# Patient Record
Sex: MALE | Race: WHITE | NOT HISPANIC OR LATINO | URBAN - METROPOLITAN AREA
[De-identification: names, ages, dates, MRNs, and addresses within clinical notes are randomized per-mention and may not be internally consistent; named-entity substitution may affect disease eponyms.]

---

## 2023-04-20 DIAGNOSIS — Z72.0 TOBACCO USE: Primary | ICD-10-CM

## 2023-04-20 NOTE — PROGRESS NOTES
His wife is asking that her  have a CT low dose lung screening; she talked to her  and he is in agreeable to this

## 2023-08-14 DIAGNOSIS — E78.2 MIXED HYPERLIPIDEMIA: Primary | ICD-10-CM

## 2023-08-14 PROBLEM — E78.5 HYPERLIPIDEMIA: Status: ACTIVE | Noted: 2023-08-14

## 2023-08-14 PROBLEM — I25.10 CORONARY ARTERY DISEASE: Status: ACTIVE | Noted: 2023-08-14

## 2023-08-14 PROBLEM — K63.5 COLON POLYPS: Status: ACTIVE | Noted: 2023-08-14

## 2023-08-14 PROBLEM — R53.83 MALAISE AND FATIGUE: Status: ACTIVE | Noted: 2023-08-14

## 2023-08-14 PROBLEM — R20.8 DECREASED SENSATION: Status: ACTIVE | Noted: 2023-08-14

## 2023-08-14 PROBLEM — K57.32 DIVERTICULITIS, COLON: Status: ACTIVE | Noted: 2023-08-14

## 2023-08-14 PROBLEM — M79.10 MYALGIA: Status: ACTIVE | Noted: 2023-08-14

## 2023-08-14 PROBLEM — R25.3 MUSCLE FASCICULATION: Status: ACTIVE | Noted: 2023-08-14

## 2023-08-14 PROBLEM — K06.9 GUM DISEASE: Status: ACTIVE | Noted: 2023-08-14

## 2023-08-14 PROBLEM — R20.0 NUMBNESS OF FINGERS OF BOTH HANDS: Status: ACTIVE | Noted: 2023-08-14

## 2023-08-14 PROBLEM — M25.50 ARTHRALGIA: Status: ACTIVE | Noted: 2023-08-14

## 2023-08-14 PROBLEM — M54.2 NECK PAIN ON RIGHT SIDE: Status: ACTIVE | Noted: 2023-08-14

## 2023-08-14 PROBLEM — R53.81 MALAISE AND FATIGUE: Status: ACTIVE | Noted: 2023-08-14

## 2023-08-14 RX ORDER — ATORVASTATIN CALCIUM 10 MG/1
10 TABLET, FILM COATED ORAL NIGHTLY
Qty: 90 TABLET | Refills: 1 | Status: SHIPPED | OUTPATIENT
Start: 2023-08-14 | End: 2023-10-13 | Stop reason: SDUPTHER

## 2023-08-14 RX ORDER — CHOLECALCIFEROL (VITAMIN D3) 125 MCG
CAPSULE ORAL
COMMUNITY

## 2023-08-14 RX ORDER — ATORVASTATIN CALCIUM 10 MG/1
10 TABLET, FILM COATED ORAL NIGHTLY
COMMUNITY
End: 2023-08-14 | Stop reason: SDUPTHER

## 2023-09-12 PROBLEM — E55.9 VITAMIN D INSUFFICIENCY: Status: ACTIVE | Noted: 2023-09-12

## 2023-09-12 PROBLEM — R80.9 PROTEINURIA: Status: ACTIVE | Noted: 2023-09-12

## 2023-09-12 PROBLEM — Z95.5 PRESENCE OF STENT IN CORONARY ARTERY: Status: ACTIVE | Noted: 2023-09-12

## 2023-09-12 PROBLEM — R63.4 WEIGHT LOSS, ABNORMAL: Status: ACTIVE | Noted: 2023-09-12

## 2023-09-12 RX ORDER — LISINOPRIL 10 MG/1
10 TABLET ORAL DAILY
COMMUNITY
End: 2023-10-13 | Stop reason: SDUPTHER

## 2023-09-12 RX ORDER — ACETAMINOPHEN, DEXTROMETHORPHAN HBR, DOXYLAMINE SUCCINATE, PHENYLEPHRINE HCL 650; 20; 12.5; 1 MG/30ML; MG/30ML; MG/30ML; MG/30ML
SOLUTION ORAL
COMMUNITY

## 2023-10-06 ENCOUNTER — OFFICE VISIT (OUTPATIENT)
Dept: PRIMARY CARE | Facility: CLINIC | Age: 63
End: 2023-10-06
Payer: COMMERCIAL

## 2023-10-06 VITALS
BODY MASS INDEX: 26.57 KG/M2 | HEIGHT: 73 IN | HEART RATE: 82 BPM | DIASTOLIC BLOOD PRESSURE: 70 MMHG | WEIGHT: 200.5 LBS | SYSTOLIC BLOOD PRESSURE: 132 MMHG

## 2023-10-06 DIAGNOSIS — Z00.00 ROUTINE GENERAL MEDICAL EXAMINATION AT A HEALTH CARE FACILITY: Primary | ICD-10-CM

## 2023-10-06 DIAGNOSIS — Z12.12 SCREENING FOR COLORECTAL CANCER: ICD-10-CM

## 2023-10-06 DIAGNOSIS — R73.03 PREDIABETES: ICD-10-CM

## 2023-10-06 DIAGNOSIS — R80.9 PROTEINURIA, UNSPECIFIED TYPE: ICD-10-CM

## 2023-10-06 DIAGNOSIS — Z13.29 SCREENING FOR THYROID DISORDER: ICD-10-CM

## 2023-10-06 DIAGNOSIS — Z12.5 SCREENING FOR PROSTATE CANCER: ICD-10-CM

## 2023-10-06 DIAGNOSIS — Z72.0 TOBACCO ABUSE: ICD-10-CM

## 2023-10-06 DIAGNOSIS — Z12.11 SCREENING FOR COLORECTAL CANCER: ICD-10-CM

## 2023-10-06 DIAGNOSIS — I25.10 CORONARY ARTERY DISEASE INVOLVING NATIVE CORONARY ARTERY OF NATIVE HEART WITHOUT ANGINA PECTORIS: ICD-10-CM

## 2023-10-06 DIAGNOSIS — M54.2 NECK PAIN ON RIGHT SIDE: ICD-10-CM

## 2023-10-06 DIAGNOSIS — E78.2 MIXED HYPERLIPIDEMIA: ICD-10-CM

## 2023-10-06 PROBLEM — R20.0 NUMBNESS OF FINGERS OF BOTH HANDS: Status: RESOLVED | Noted: 2023-08-14 | Resolved: 2023-10-06

## 2023-10-06 PROBLEM — R53.81 MALAISE AND FATIGUE: Status: RESOLVED | Noted: 2023-08-14 | Resolved: 2023-10-06

## 2023-10-06 PROBLEM — R25.3 MUSCLE FASCICULATION: Status: RESOLVED | Noted: 2023-08-14 | Resolved: 2023-10-06

## 2023-10-06 PROBLEM — R53.83 MALAISE AND FATIGUE: Status: RESOLVED | Noted: 2023-08-14 | Resolved: 2023-10-06

## 2023-10-06 PROBLEM — R20.8 DECREASED SENSATION: Status: RESOLVED | Noted: 2023-08-14 | Resolved: 2023-10-06

## 2023-10-06 PROBLEM — R63.4 WEIGHT LOSS, ABNORMAL: Status: RESOLVED | Noted: 2023-09-12 | Resolved: 2023-10-06

## 2023-10-06 PROCEDURE — 83036 HEMOGLOBIN GLYCOSYLATED A1C: CPT

## 2023-10-06 PROCEDURE — 99396 PREV VISIT EST AGE 40-64: CPT | Performed by: INTERNAL MEDICINE

## 2023-10-06 PROCEDURE — 80061 LIPID PANEL: CPT

## 2023-10-06 PROCEDURE — 84443 ASSAY THYROID STIM HORMONE: CPT

## 2023-10-06 PROCEDURE — 85027 COMPLETE CBC AUTOMATED: CPT

## 2023-10-06 PROCEDURE — 81001 URINALYSIS AUTO W/SCOPE: CPT

## 2023-10-06 PROCEDURE — 36415 COLL VENOUS BLD VENIPUNCTURE: CPT

## 2023-10-06 PROCEDURE — 84153 ASSAY OF PSA TOTAL: CPT

## 2023-10-06 PROCEDURE — 80053 COMPREHEN METABOLIC PANEL: CPT

## 2023-10-06 RX ORDER — GLUCOSAM/CHONDRO/HERB 149/HYAL 750-100 MG
1 TABLET ORAL DAILY
COMMUNITY

## 2023-10-06 ASSESSMENT — ENCOUNTER SYMPTOMS
CONSTIPATION: 0
LIGHT-HEADEDNESS: 0
JOINT SWELLING: 0
NECK PAIN: 0
FATIGUE: 0
ACTIVITY CHANGE: 0
NAUSEA: 0
SINUS PAIN: 0
DYSPHORIC MOOD: 0
DIFFICULTY URINATING: 0
HYPERACTIVE: 0
AGITATION: 0
SEIZURES: 0
EYE DISCHARGE: 0
SHORTNESS OF BREATH: 0
DIAPHORESIS: 0
APPETITE CHANGE: 0
DECREASED CONCENTRATION: 0
EYE ITCHING: 0
COLOR CHANGE: 0
HEADACHES: 0
BLOOD IN STOOL: 0
WHEEZING: 0
TREMORS: 0
BRUISES/BLEEDS EASILY: 0
FEVER: 0
SORE THROAT: 0
UNEXPECTED WEIGHT CHANGE: 0
CHOKING: 0
NECK STIFFNESS: 0
ABDOMINAL PAIN: 0
SLEEP DISTURBANCE: 0
FACIAL ASYMMETRY: 0
NERVOUS/ANXIOUS: 0
ANAL BLEEDING: 0
HALLUCINATIONS: 0
FLANK PAIN: 0
NUMBNESS: 0
COUGH: 0
ARTHRALGIAS: 1
EYE REDNESS: 0
APNEA: 0
VOMITING: 0
DIZZINESS: 0
WEAKNESS: 0
CHILLS: 0
DYSURIA: 0
POLYDIPSIA: 0
DIARRHEA: 0
PHOTOPHOBIA: 0
HEMATURIA: 0
FACIAL SWELLING: 0
PALPITATIONS: 0
WOUND: 0
CHEST TIGHTNESS: 0
POLYPHAGIA: 0
SINUS PRESSURE: 0
RECTAL PAIN: 0
VOICE CHANGE: 0
TROUBLE SWALLOWING: 0
ADENOPATHY: 0
RHINORRHEA: 0
MYALGIAS: 0
SPEECH DIFFICULTY: 0
CONFUSION: 0
EYE PAIN: 0
BACK PAIN: 0
STRIDOR: 0
FREQUENCY: 0
ABDOMINAL DISTENTION: 0

## 2023-10-06 NOTE — ASSESSMENT & PLAN NOTE
Encouraged pt to work on lessening quantity and if able quit   Pt declines ct low dose chest at this time

## 2023-10-06 NOTE — PROGRESS NOTES
Pt canceled due tocalcium, will do cta Subjective   Patient ID: Devyn Pedraza is a 63 y.o. male who presents for Annual Exam (H&P).    HPI    Pt here for full physical.  He is doing more physical labor for work.  Appetite and energy levels are good.      He did not get the colonoscopy as ordered last year.  He tells me he is using 2 tbs of olive oil in AM to help with bowels.      He has smokers cough.  He continues to smoke 1/2-1 ppd.  He declines ct lung screening.    He is taking his medications without issues.  No refills needed.    He declines flu shot today.  No interest in shingles vaccine.      Review of Systems   Constitutional:  Negative for activity change, appetite change, chills, diaphoresis, fatigue, fever and unexpected weight change.   HENT:  Negative for congestion, dental problem, drooling, ear discharge, ear pain, facial swelling, hearing loss, mouth sores, nosebleeds, postnasal drip, rhinorrhea, sinus pressure, sinus pain, sneezing, sore throat, tinnitus, trouble swallowing and voice change.    Eyes:  Positive for visual disturbance (some changes in vision; no recent exam). Negative for photophobia, pain, discharge, redness and itching.   Respiratory:  Negative for apnea, cough, choking, chest tightness, shortness of breath, wheezing and stridor.    Cardiovascular:  Negative for chest pain, palpitations and leg swelling.   Gastrointestinal:  Negative for abdominal distention, abdominal pain, anal bleeding, blood in stool, constipation, diarrhea, nausea, rectal pain and vomiting.   Endocrine: Negative for cold intolerance, heat intolerance, polydipsia, polyphagia and polyuria.   Genitourinary:  Negative for decreased urine volume, difficulty urinating, dysuria, enuresis, flank pain, frequency, genital sores, hematuria, penile discharge, penile pain, penile swelling, scrotal swelling, testicular pain and urgency.        +nocturia   Musculoskeletal:  Positive for arthralgias (right shoulder at times). Negative for back pain, gait  "problem, joint swelling, myalgias, neck pain and neck stiffness.   Skin:  Negative for color change, pallor, rash and wound.   Allergic/Immunologic: Negative for environmental allergies, food allergies and immunocompromised state.   Neurological:  Negative for dizziness, tremors, seizures, syncope, facial asymmetry, speech difficulty, weakness, light-headedness, numbness and headaches.   Hematological:  Negative for adenopathy. Does not bruise/bleed easily.   Psychiatric/Behavioral:  Negative for agitation, behavioral problems, confusion, decreased concentration, dysphoric mood, hallucinations, self-injury, sleep disturbance and suicidal ideas. The patient is not nervous/anxious and is not hyperactive.        Objective   /70   Pulse 82   Ht 1.854 m (6' 1\")   Wt 90.9 kg (200 lb 8 oz)   BMI 26.45 kg/m²    Physical Exam  Constitutional:       Appearance: Normal appearance.   HENT:      Head: Normocephalic and atraumatic.      Right Ear: Tympanic membrane, ear canal and external ear normal. There is no impacted cerumen.      Left Ear: Tympanic membrane, ear canal and external ear normal. There is no impacted cerumen.      Nose: Nose normal. No congestion or rhinorrhea.      Mouth/Throat:      Mouth: Mucous membranes are moist.      Pharynx: Oropharynx is clear. No oropharyngeal exudate or posterior oropharyngeal erythema.   Eyes:      Extraocular Movements: Extraocular movements intact.      Conjunctiva/sclera: Conjunctivae normal.      Pupils: Pupils are equal, round, and reactive to light.   Neck:      Vascular: No carotid bruit.   Cardiovascular:      Rate and Rhythm: Normal rate and regular rhythm.      Pulses: Normal pulses.      Heart sounds: Normal heart sounds. No murmur heard.  Pulmonary:      Effort: Pulmonary effort is normal. No respiratory distress.      Breath sounds: Normal breath sounds. No wheezing, rhonchi or rales.   Abdominal:      General: Abdomen is flat. Bowel sounds are normal. There is " no distension.      Palpations: Abdomen is soft.      Tenderness: There is no abdominal tenderness.      Hernia: No hernia is present.   Musculoskeletal:         General: No swelling or tenderness. Normal range of motion.      Cervical back: Normal range of motion and neck supple.      Right lower leg: No edema.      Left lower leg: No edema.   Lymphadenopathy:      Cervical: No cervical adenopathy.   Skin:     General: Skin is warm and dry.      Findings: No lesion or rash.   Neurological:      General: No focal deficit present.      Mental Status: He is alert and oriented to person, place, and time.      Cranial Nerves: No cranial nerve deficit.      Sensory: No sensory deficit.      Motor: No weakness.   Psychiatric:         Mood and Affect: Mood normal.         Behavior: Behavior normal.         Thought Content: Thought content normal.         Judgment: Judgment normal.         Assessment/Plan   Problem List Items Addressed This Visit       Coronary artery disease     On low dose statin only   S/p stents placed  Encouraged pt to restart ASA 81 mg if able tolerate            Hyperlipidemia    Relevant Orders    Lipid Panel    Neck pain on right side     Recently did massage therapy which did help          Proteinuria     On lisinopril   Repeat urine today          Relevant Orders    Urinalysis with Reflex Microscopic    Tobacco abuse     Encouraged pt to work on lessening quantity and if able quit   Pt declines ct low dose chest at this time           Other Visit Diagnoses       Routine general medical examination at a health care facility    -  Primary    Relevant Orders    Comprehensive Metabolic Panel    CBC    Follow Up In Primary Care - Health Maintenance    Screening for colorectal cancer        Relevant Orders    Colonoscopy Screening    Screening for prostate cancer        Relevant Orders    Prostate Specific Antigen    Screening for thyroid disorder        Relevant Orders    TSH    Prediabetes         Relevant Orders    Hemoglobin A1C

## 2023-10-06 NOTE — PATIENT INSTRUCTIONS
We did blood work and urine done today; will call if something is abnormal  Continue medications as directed-call when refills needed  Consider and do recommend flu shot/covid booster and 2 part shingles vaccine  Please call and schedule your colonoscopy-order is in  Consider low dose CT lung for screening  Continue to work on lessening drinking and smoking   Follow up in 1 year-sooner if needed

## 2023-10-06 NOTE — ASSESSMENT & PLAN NOTE
On low dose statin only   S/p stents placed  Encouraged pt to restart ASA 81 mg if able tolerate

## 2023-10-07 LAB
ALBUMIN SERPL BCP-MCNC: 4.4 G/DL (ref 3.4–5)
ALP SERPL-CCNC: 70 U/L (ref 33–136)
ALT SERPL W P-5'-P-CCNC: 25 U/L (ref 10–52)
ANION GAP SERPL CALC-SCNC: 11 MMOL/L (ref 10–20)
APPEARANCE UR: ABNORMAL
AST SERPL W P-5'-P-CCNC: 20 U/L (ref 9–39)
BILIRUB SERPL-MCNC: 0.4 MG/DL (ref 0–1.2)
BILIRUB UR STRIP.AUTO-MCNC: NEGATIVE MG/DL
BUN SERPL-MCNC: 14 MG/DL (ref 6–23)
CALCIUM SERPL-MCNC: 9.7 MG/DL (ref 8.6–10.6)
CHLORIDE SERPL-SCNC: 105 MMOL/L (ref 98–107)
CHOLEST SERPL-MCNC: 188 MG/DL (ref 0–199)
CHOLESTEROL/HDL RATIO: 5
CO2 SERPL-SCNC: 27 MMOL/L (ref 21–32)
COLOR UR: YELLOW
CREAT SERPL-MCNC: 0.85 MG/DL (ref 0.5–1.3)
ERYTHROCYTE [DISTWIDTH] IN BLOOD BY AUTOMATED COUNT: 13.7 % (ref 11.5–14.5)
EST. AVERAGE GLUCOSE BLD GHB EST-MCNC: 120 MG/DL
GFR SERPL CREATININE-BSD FRML MDRD: >90 ML/MIN/1.73M*2
GLUCOSE SERPL-MCNC: 102 MG/DL (ref 74–99)
GLUCOSE UR STRIP.AUTO-MCNC: NEGATIVE MG/DL
HBA1C MFR BLD: 5.8 %
HCT VFR BLD AUTO: 48.2 % (ref 41–52)
HDLC SERPL-MCNC: 37.8 MG/DL
HGB BLD-MCNC: 15.9 G/DL (ref 13.5–17.5)
KETONES UR STRIP.AUTO-MCNC: NEGATIVE MG/DL
LDLC SERPL CALC-MCNC: 110 MG/DL (ref 140–190)
LEUKOCYTE ESTERASE UR QL STRIP.AUTO: NEGATIVE
MCH RBC QN AUTO: 31.2 PG (ref 26–34)
MCHC RBC AUTO-ENTMCNC: 33 G/DL (ref 32–36)
MCV RBC AUTO: 95 FL (ref 80–100)
MUCOUS THREADS #/AREA URNS AUTO: NORMAL /LPF
NITRITE UR QL STRIP.AUTO: NEGATIVE
NON HDL CHOLESTEROL: 150 MG/DL (ref 0–149)
NRBC BLD-RTO: 0 /100 WBCS (ref 0–0)
PH UR STRIP.AUTO: 5 [PH]
PLATELET # BLD AUTO: 185 X10*3/UL (ref 150–450)
PMV BLD AUTO: 12.8 FL (ref 7.5–11.5)
POTASSIUM SERPL-SCNC: 4.3 MMOL/L (ref 3.5–5.3)
PROT SERPL-MCNC: 7.3 G/DL (ref 6.4–8.2)
PROT UR STRIP.AUTO-MCNC: ABNORMAL MG/DL
PSA SERPL-MCNC: 2.48 NG/ML
RBC # BLD AUTO: 5.1 X10*6/UL (ref 4.5–5.9)
RBC # UR STRIP.AUTO: NEGATIVE /UL
RBC #/AREA URNS AUTO: NORMAL /HPF
SODIUM SERPL-SCNC: 139 MMOL/L (ref 136–145)
SP GR UR STRIP.AUTO: 1.02
TRIGL SERPL-MCNC: 202 MG/DL (ref 0–149)
TSH SERPL-ACNC: 0.95 MIU/L (ref 0.44–3.98)
UROBILINOGEN UR STRIP.AUTO-MCNC: <2 MG/DL
VLDL: 40 MG/DL (ref 0–40)
WBC # BLD AUTO: 4.9 X10*3/UL (ref 4.4–11.3)
WBC #/AREA URNS AUTO: NORMAL /HPF

## 2023-10-10 ENCOUNTER — TELEPHONE (OUTPATIENT)
Dept: PRIMARY CARE | Facility: CLINIC | Age: 63
End: 2023-10-10
Payer: COMMERCIAL

## 2023-10-10 DIAGNOSIS — R80.9 PROTEINURIA, UNSPECIFIED TYPE: Primary | ICD-10-CM

## 2023-10-10 DIAGNOSIS — E78.2 MIXED HYPERLIPIDEMIA: ICD-10-CM

## 2023-10-10 NOTE — TELEPHONE ENCOUNTER
-Pts cholesterol is still not to goal with history of having heart stents; recommend we increase dose at least to 20 mg/day but technically he should be even higher (80 mg)   He continues to have protein in urine so needs to take lisinopril and we can consider dose increase on that as well (up to 20 mg) since BP was a bit high.      -LMOAM

## 2023-10-12 NOTE — TELEPHONE ENCOUNTER
-Pts cholesterol is still not to goal with history of having heart stents; recommend we increase dose at least to 20 mg/day but technically he should be even higher (80 mg)   He continues to have protein in urine so needs to take lisinopril and we can consider dose increase on that as well (up to 20 mg) since BP was a bit high.             Devyn informed of the above, he is wiling to go up to 20mg on the statin, but the Lisinopril he has concerns says when he stands up to quickly he gets dizzy, can he cut 1 pill in half and go up to 15mg??    Please advise

## 2023-10-13 RX ORDER — LISINOPRIL 10 MG/1
15 TABLET ORAL DAILY
Qty: 135 TABLET | Refills: 3 | Status: SHIPPED | OUTPATIENT
Start: 2023-10-13

## 2023-10-13 RX ORDER — ATORVASTATIN CALCIUM 20 MG/1
20 TABLET, FILM COATED ORAL NIGHTLY
Qty: 90 TABLET | Refills: 3 | Status: SHIPPED | OUTPATIENT
Start: 2023-10-13

## 2024-01-04 DIAGNOSIS — Z12.11 COLON CANCER SCREENING: Primary | ICD-10-CM

## 2024-01-04 RX ORDER — SOD SULF/POT CHLORIDE/MAG SULF 1.479 G
12 TABLET ORAL 2 TIMES DAILY
Qty: 24 TABLET | Refills: 0 | Status: SHIPPED | OUTPATIENT
Start: 2024-01-04 | End: 2024-01-05

## 2024-01-19 ENCOUNTER — TELEPHONE (OUTPATIENT)
Dept: PRIMARY CARE | Facility: CLINIC | Age: 64
End: 2024-01-19
Payer: COMMERCIAL

## 2024-01-19 DIAGNOSIS — Z12.12 SCREENING FOR COLORECTAL CANCER: Primary | ICD-10-CM

## 2024-01-19 DIAGNOSIS — Z12.11 SCREENING FOR COLORECTAL CANCER: Primary | ICD-10-CM

## 2024-01-19 NOTE — TELEPHONE ENCOUNTER
Devyn called stating he tried to schedule his colonoscopy he wants done at [facility], was told having scheduling issues and was told to call you and ask you to enter another order so they can schedule him.

## 2024-06-06 ENCOUNTER — HOSPITAL ENCOUNTER (EMERGENCY)
Facility: HOSPITAL | Age: 64
Discharge: HOME | End: 2024-06-06
Attending: EMERGENCY MEDICINE
Payer: COMMERCIAL

## 2024-06-06 ENCOUNTER — TELEPHONE (OUTPATIENT)
Dept: PRIMARY CARE | Facility: CLINIC | Age: 64
End: 2024-06-06
Payer: COMMERCIAL

## 2024-06-06 ENCOUNTER — APPOINTMENT (OUTPATIENT)
Dept: RADIOLOGY | Facility: HOSPITAL | Age: 64
End: 2024-06-06
Payer: COMMERCIAL

## 2024-06-06 VITALS
OXYGEN SATURATION: 98 % | BODY MASS INDEX: 26.95 KG/M2 | SYSTOLIC BLOOD PRESSURE: 151 MMHG | RESPIRATION RATE: 19 BRPM | TEMPERATURE: 97 F | DIASTOLIC BLOOD PRESSURE: 79 MMHG | HEART RATE: 88 BPM | WEIGHT: 210 LBS | HEIGHT: 74 IN

## 2024-06-06 DIAGNOSIS — H53.9 CHANGES IN VISION: Primary | ICD-10-CM

## 2024-06-06 LAB
ALBUMIN SERPL BCP-MCNC: 4.4 G/DL (ref 3.4–5)
ALP SERPL-CCNC: 77 U/L (ref 33–136)
ALT SERPL W P-5'-P-CCNC: 24 U/L (ref 10–52)
ANION GAP SERPL CALC-SCNC: 13 MMOL/L (ref 10–20)
AST SERPL W P-5'-P-CCNC: 18 U/L (ref 9–39)
BASOPHILS # BLD AUTO: 0.06 X10*3/UL (ref 0–0.1)
BASOPHILS NFR BLD AUTO: 1 %
BILIRUB SERPL-MCNC: 0.4 MG/DL (ref 0–1.2)
BUN SERPL-MCNC: 18 MG/DL (ref 6–23)
CALCIUM SERPL-MCNC: 9.6 MG/DL (ref 8.6–10.3)
CHLORIDE SERPL-SCNC: 105 MMOL/L (ref 98–107)
CO2 SERPL-SCNC: 25 MMOL/L (ref 21–32)
CREAT SERPL-MCNC: 0.94 MG/DL (ref 0.5–1.3)
EGFRCR SERPLBLD CKD-EPI 2021: >90 ML/MIN/1.73M*2
EOSINOPHIL # BLD AUTO: 0.24 X10*3/UL (ref 0–0.7)
EOSINOPHIL NFR BLD AUTO: 4.2 %
ERYTHROCYTE [DISTWIDTH] IN BLOOD BY AUTOMATED COUNT: 13.6 % (ref 11.5–14.5)
GLUCOSE SERPL-MCNC: 107 MG/DL (ref 74–99)
HCT VFR BLD AUTO: 46.7 % (ref 41–52)
HGB BLD-MCNC: 15.4 G/DL (ref 13.5–17.5)
IMM GRANULOCYTES # BLD AUTO: 0.02 X10*3/UL (ref 0–0.7)
IMM GRANULOCYTES NFR BLD AUTO: 0.3 % (ref 0–0.9)
LYMPHOCYTES # BLD AUTO: 1.39 X10*3/UL (ref 1.2–4.8)
LYMPHOCYTES NFR BLD AUTO: 24.2 %
MCH RBC QN AUTO: 29.9 PG (ref 26–34)
MCHC RBC AUTO-ENTMCNC: 33 G/DL (ref 32–36)
MCV RBC AUTO: 91 FL (ref 80–100)
MONOCYTES # BLD AUTO: 0.62 X10*3/UL (ref 0.1–1)
MONOCYTES NFR BLD AUTO: 10.8 %
NEUTROPHILS # BLD AUTO: 3.41 X10*3/UL (ref 1.2–7.7)
NEUTROPHILS NFR BLD AUTO: 59.5 %
NRBC BLD-RTO: 0 /100 WBCS (ref 0–0)
PLATELET # BLD AUTO: 146 X10*3/UL (ref 150–450)
POTASSIUM SERPL-SCNC: 3.9 MMOL/L (ref 3.5–5.3)
PROT SERPL-MCNC: 7.6 G/DL (ref 6.4–8.2)
RBC # BLD AUTO: 5.15 X10*6/UL (ref 4.5–5.9)
SODIUM SERPL-SCNC: 139 MMOL/L (ref 136–145)
WBC # BLD AUTO: 5.7 X10*3/UL (ref 4.4–11.3)

## 2024-06-06 PROCEDURE — 70450 CT HEAD/BRAIN W/O DYE: CPT | Performed by: RADIOLOGY

## 2024-06-06 PROCEDURE — 99284 EMERGENCY DEPT VISIT MOD MDM: CPT | Mod: 25

## 2024-06-06 PROCEDURE — 36415 COLL VENOUS BLD VENIPUNCTURE: CPT | Performed by: NURSE PRACTITIONER

## 2024-06-06 PROCEDURE — 70450 CT HEAD/BRAIN W/O DYE: CPT

## 2024-06-06 PROCEDURE — 84075 ASSAY ALKALINE PHOSPHATASE: CPT | Performed by: NURSE PRACTITIONER

## 2024-06-06 PROCEDURE — 85025 COMPLETE CBC W/AUTO DIFF WBC: CPT | Performed by: NURSE PRACTITIONER

## 2024-06-06 ASSESSMENT — COLUMBIA-SUICIDE SEVERITY RATING SCALE - C-SSRS
6. HAVE YOU EVER DONE ANYTHING, STARTED TO DO ANYTHING, OR PREPARED TO DO ANYTHING TO END YOUR LIFE?: NO
1. IN THE PAST MONTH, HAVE YOU WISHED YOU WERE DEAD OR WISHED YOU COULD GO TO SLEEP AND NOT WAKE UP?: NO
2. HAVE YOU ACTUALLY HAD ANY THOUGHTS OF KILLING YOURSELF?: NO

## 2024-06-06 ASSESSMENT — PAIN SCALES - GENERAL: PAINLEVEL_OUTOF10: 0 - NO PAIN

## 2024-06-06 ASSESSMENT — PAIN - FUNCTIONAL ASSESSMENT: PAIN_FUNCTIONAL_ASSESSMENT: 0-10

## 2024-06-06 NOTE — DISCHARGE INSTRUCTIONS
Please schedule to see the ophthalmologist to continue care for your vision.  Please return ED for worsening headache, difficulty moving arms legs, change in behavior or any other concerning symptoms.

## 2024-06-06 NOTE — ED TRIAGE NOTES
"Pt presents to ED for intermittent R eye blurred vision since 1300 yesterday. Pt taking OTC meds and vision resolved then reoccurred PTA. Pt denies headache. Pt reports weakness/ tingling for \"a while\" in ALEXEY UE/LE. Denies CP/SOB.   "

## 2024-06-06 NOTE — ED TRIAGE NOTES
TRIAGE NOTE   I saw the patient as the Clinician in Triage and performed a brief history and physical exam, established acuity, and ordered appropriate tests to develop basic plan of care. Patient will be seen by an [facility], resident and/or physician who will independently evaluate the patient. Please see subsequent provider notes for further details and disposition.     Brief HPI: In brief, Devyn Pedraza is a 63 y.o. male that presents for right eye blurriness.  States that the symptoms have been going on intermittently for the last several days.  Also states that with his blurry vision he does get bilateral arm tingling and occasional tingling in his lower extremities.    Focused Physical exam:   Alert and oriented  Regular rate and rhythm  NIH of 0 in triage    Plan/MDM:   CT brain, CBC, CMP    Please see subsequent provider note for further details and disposition

## 2024-06-06 NOTE — ED PROVIDER NOTES
HPI   Chief Complaint   Patient presents with    Blurred Vision       This is a 63-year-old man with past medical history of prediabetes, hypertension who does present with complaint of intermittent right eye right lateral blurriness.  No laney vision loss.  No visual field cuts.  No headache.  No infectious symptoms.  Comes and goes and does improve with pain medication.  Denies any ripping sensation to the neck or back.  Denies any fever or chills.  Has infectious symptoms.  No eye pain.                        No data recorded                   Patient History   Past Medical History:   Diagnosis Date    Abnormal weight loss 10/21/2019    Weight loss, unintentional    Fasciculation 10/21/2019    Muscle twitching    Localized enlarged lymph nodes 05/03/2022    Cervical lymphadenopathy    Low back pain, unspecified 05/03/2022    Acute right-sided low back pain without sciatica    Other specified postprocedural states 05/07/2018    S/P ablation of atrial flutter    Personal history of diseases of the blood and blood-forming organs and certain disorders involving the immune mechanism 05/16/2022    History of thrombocytopenia    Personal history of other diseases of the circulatory system     History of atrial fibrillation     Past Surgical History:   Procedure Laterality Date    OTHER SURGICAL HISTORY  10/21/2019    Coronary artery stent placement; multiple stents    REFRACTIVE SURGERY       Family History   Problem Relation Name Age of Onset    Heart attack Father          age 52    Mental illness Son       Social History     Tobacco Use    Smoking status: Every Day     Current packs/day: 1.00     Average packs/day: 1 pack/day for 40.0 years (40.0 ttl pk-yrs)     Types: Cigarettes    Smokeless tobacco: Never   Vaping Use    Vaping status: Never Used   Substance Use Topics    Alcohol use: Yes     Comment: 1/2 bottle bourbon 2 times a week    Drug use: Yes     Types: Marijuana       Physical Exam   ED Triage Vitals  [06/06/24 1722]   Temperature Heart Rate Respirations BP   36.1 °C (97 °F) 88 19 151/79      Pulse Ox Temp Source Heart Rate Source Patient Position   98 % Temporal Monitor --      BP Location FiO2 (%)     -- --       Physical Exam  Vitals and nursing note reviewed.   Constitutional:       Appearance: Normal appearance. He is normal weight.   HENT:      Head: Normocephalic and atraumatic.      Nose: Nose normal.      Mouth/Throat:      Mouth: Mucous membranes are dry.      Pharynx: Oropharynx is clear.   Eyes:      Extraocular Movements: Extraocular movements intact.      Conjunctiva/sclera: Conjunctivae normal.      Pupils: Pupils are equal, round, and reactive to light.      Comments: EOMI intact bilaterally.  No double vision.  His visual fields are all intact.  No vision loss noted on exam.   Cardiovascular:      Rate and Rhythm: Normal rate and regular rhythm.      Heart sounds: Normal heart sounds.   Pulmonary:      Effort: Pulmonary effort is normal.      Breath sounds: Normal breath sounds.   Abdominal:      General: Abdomen is flat. Bowel sounds are normal. There is no distension.      Palpations: Abdomen is soft.      Tenderness: There is no abdominal tenderness. There is no right CVA tenderness, left CVA tenderness, guarding or rebound.   Musculoskeletal:         General: Normal range of motion.      Cervical back: Normal range of motion and neck supple.   Skin:     General: Skin is warm and dry.      Capillary Refill: Capillary refill takes less than 2 seconds.   Neurological:      General: No focal deficit present.      Mental Status: He is alert and oriented to person, place, and time. Mental status is at baseline.      Sensory: No sensory deficit.      Motor: No weakness.   Psychiatric:         Mood and Affect: Mood normal.         Behavior: Behavior normal.         Thought Content: Thought content normal.         Judgment: Judgment normal.         ED Course & MDM   Diagnoses as of 06/06/24 7309    Changes in vision       Medical Decision Making  CT head and chest x-ray and lab work are sent.  The labs did not show any elevated blood cell count.  There was no acute kidney injury.  No metabolic anion gap acidosis. No hyperglycemia is noted.  A CAT scan of the head is obtained which did not show any acute pathology.  There was some sinusitis noted.  Chronic small vessel ischemic changes noted.  No acute stroke.  Patient received discharged home plan to follow-up with ophthalmology as outpatient.  Return precautions are discussed.    Amount and/or Complexity of Data Reviewed  Labs: ordered. Decision-making details documented in ED Course.  Radiology: ordered. Decision-making details documented in ED Course.        Procedure  Procedures     Fransisco Olsen MD  06/06/24 2014

## 2024-06-06 NOTE — TELEPHONE ENCOUNTER
Patient called today experiencing blurred vision, tingling in left arm, not feeling well, advised to go to [facility] ED or [facility].

## 2024-07-02 ENCOUNTER — APPOINTMENT (OUTPATIENT)
Dept: PRIMARY CARE | Facility: CLINIC | Age: 64
End: 2024-07-02
Payer: COMMERCIAL

## 2024-07-02 VITALS
WEIGHT: 197.4 LBS | BODY MASS INDEX: 25.34 KG/M2 | HEART RATE: 77 BPM | SYSTOLIC BLOOD PRESSURE: 112 MMHG | DIASTOLIC BLOOD PRESSURE: 70 MMHG

## 2024-07-02 DIAGNOSIS — R53.83 FATIGUE, UNSPECIFIED TYPE: ICD-10-CM

## 2024-07-02 DIAGNOSIS — I73.9 CLAUDICATION (CMS-HCC): Primary | ICD-10-CM

## 2024-07-02 DIAGNOSIS — R41.89 BRAIN FOG: ICD-10-CM

## 2024-07-02 DIAGNOSIS — R25.3 MUSCLE FASCICULATION: ICD-10-CM

## 2024-07-02 DIAGNOSIS — R80.9 PROTEINURIA, UNSPECIFIED TYPE: ICD-10-CM

## 2024-07-02 DIAGNOSIS — G62.9 NEUROPATHY: ICD-10-CM

## 2024-07-02 DIAGNOSIS — M25.50 ARTHRALGIA, UNSPECIFIED JOINT: ICD-10-CM

## 2024-07-02 DIAGNOSIS — M79.10 MYALGIA: ICD-10-CM

## 2024-07-02 PROCEDURE — 82746 ASSAY OF FOLIC ACID SERUM: CPT

## 2024-07-02 PROCEDURE — 99214 OFFICE O/P EST MOD 30 MIN: CPT | Performed by: INTERNAL MEDICINE

## 2024-07-02 PROCEDURE — 85652 RBC SED RATE AUTOMATED: CPT

## 2024-07-02 PROCEDURE — 84443 ASSAY THYROID STIM HORMONE: CPT

## 2024-07-02 PROCEDURE — 86038 ANTINUCLEAR ANTIBODIES: CPT

## 2024-07-02 PROCEDURE — 82550 ASSAY OF CK (CPK): CPT

## 2024-07-02 PROCEDURE — 86140 C-REACTIVE PROTEIN: CPT

## 2024-07-02 PROCEDURE — 86431 RHEUMATOID FACTOR QUANT: CPT

## 2024-07-02 PROCEDURE — 36415 COLL VENOUS BLD VENIPUNCTURE: CPT

## 2024-07-02 PROCEDURE — 82607 VITAMIN B-12: CPT

## 2024-07-02 RX ORDER — LISINOPRIL 10 MG/1
10 TABLET ORAL DAILY
Status: SHIPPED
Start: 2024-07-02

## 2024-07-02 ASSESSMENT — ENCOUNTER SYMPTOMS
HALLUCINATIONS: 0
HYPERACTIVE: 0
AGITATION: 0
WEAKNESS: 1
DIZZINESS: 0
NUMBNESS: 1
SLEEP DISTURBANCE: 1
LIGHT-HEADEDNESS: 0
HEADACHES: 0
ARTHRALGIAS: 1
DECREASED CONCENTRATION: 0
MYALGIAS: 1
CONFUSION: 1
FATIGUE: 1
DYSPHORIC MOOD: 0
NERVOUS/ANXIOUS: 0

## 2024-07-02 ASSESSMENT — PATIENT HEALTH QUESTIONNAIRE - PHQ9
SUM OF ALL RESPONSES TO PHQ9 QUESTIONS 1 AND 2: 0
2. FEELING DOWN, DEPRESSED OR HOPELESS: NOT AT ALL
1. LITTLE INTEREST OR PLEASURE IN DOING THINGS: NOT AT ALL

## 2024-07-02 NOTE — PROGRESS NOTES
Subjective   Patient ID: Devyn Pedraza is a 63 y.o. male who presents for Hospital Follow-up.    HPI  Pt here in follow up to ER visit for blurry vision.  He tells me his vision has changed over the last year.  He was sitting watching TV one night and his right eye had a curtain like effect.  He went to ER as he was afraid he had a stroke.  He had labs and CT scan that were negative.      He did see eye doc and had dilated eye exam that only showed some hypertensive retinopathy.  The curtain loss of vision has improved but he still has some blurred vision.  He was told possible ocular migraine.      He has questions today regarding his statin use.   He has muscle twitching/leg weakness/brain fog/memory change/joint pain.  He admits to not being very active.  He is worried it is medication induced.  He also has numbness of his upper extremities at times.  His legs feel tired after walking more than 2 miles at times.  He is a 1 ppd smoker.          Review of Systems   Constitutional:  Positive for fatigue.   Musculoskeletal:  Positive for arthralgias and myalgias.   Neurological:  Positive for weakness and numbness. Negative for dizziness, light-headedness and headaches.   Psychiatric/Behavioral:  Positive for confusion and sleep disturbance. Negative for agitation, behavioral problems, decreased concentration, dysphoric mood, hallucinations, self-injury and suicidal ideas. The patient is not nervous/anxious and is not hyperactive.        Objective   /70 (BP Location: Right arm, Patient Position: Sitting, BP Cuff Size: Adult)   Pulse 77   Wt 89.5 kg (197 lb 6.4 oz)   BMI 25.34 kg/m²    Physical Exam  Constitutional:       Appearance: Normal appearance.   Cardiovascular:      Rate and Rhythm: Normal rate and regular rhythm.      Heart sounds: Normal heart sounds.   Musculoskeletal:         General: No swelling, tenderness, deformity or signs of injury. Normal range of motion.      Cervical back: Normal range  of motion. No tenderness.      Right lower leg: No edema.      Left lower leg: No edema.   Neurological:      General: No focal deficit present.      Mental Status: He is alert and oriented to person, place, and time. Mental status is at baseline.      Cranial Nerves: No cranial nerve deficit.      Sensory: No sensory deficit.      Motor: No weakness.      Coordination: Coordination normal.      Gait: Gait normal.      Deep Tendon Reflexes: Reflexes normal.   Psychiatric:         Mood and Affect: Mood normal.         Assessment/Plan   Problem List Items Addressed This Visit       Arthralgia    Relevant Orders    Sedimentation Rate    C-Reactive Protein    Claudication (CMS-HCC) - Primary     Pt has signs/symptoms of possible claudication and PAD due to tobacco abuse  Will have him get PVR's with exercise to further assess  Recommend starting low dose ASA 81 mg/day   Labs to rule out autoimmune/rheum issues and muscle break down  B12/folate/TSH due to neuropathy symptoms   Can take break from statin for 2-3 weeks to see if muscle pain/aches improve          Relevant Orders    Vascular US PVR with exercise    Proteinuria    Relevant Medications    lisinopril 10 mg tablet     Other Visit Diagnoses       Muscle fasciculation        Neuropathy        Relevant Orders    Vitamin B12    Folate    Brain fog        Relevant Orders    TSH with reflex to Free T4 if abnormal    Fatigue, unspecified type        Relevant Orders    TSH with reflex to Free T4 if abnormal

## 2024-07-02 NOTE — PATIENT INSTRUCTIONS
Continue Lisinopril 10 mg for now but check BP at home-goal is <140/90 consistently and if able <130/80  We are doing blood work today to look into autoimmune/rheumatologic disorders as well as vitamin deficiencies that could be cause of symptoms  Call and schedule PVR testing for your lower extremities due to concern for plaque formation in the arteries of the legs leading to claudication symptoms (weakness/tired feeling in legs)  Consider taking low dose baby ASA daily due to smoking history and concern for plaque formation  Can take 2-3 weeks off statin therapy to see if this helps improve pain in muscles and joints-if so let me know and remain off; if no change noted return to daily use   Follow up here based on results

## 2024-07-02 NOTE — ASSESSMENT & PLAN NOTE
Pt has signs/symptoms of possible claudication and PAD due to tobacco abuse  Will have him get PVR's with exercise to further assess  Recommend starting low dose ASA 81 mg/day   Labs to rule out autoimmune/rheum issues and muscle break down  B12/folate/TSH due to neuropathy symptoms   Can take break from statin for 2-3 weeks to see if muscle pain/aches improve

## 2024-07-03 ENCOUNTER — TELEPHONE (OUTPATIENT)
Dept: PRIMARY CARE | Facility: CLINIC | Age: 64
End: 2024-07-03
Payer: COMMERCIAL

## 2024-07-03 LAB
CK SERPL-CCNC: 80 U/L (ref 0–325)
CRP SERPL-MCNC: 1.12 MG/DL
ERYTHROCYTE [SEDIMENTATION RATE] IN BLOOD BY WESTERGREN METHOD: 9 MM/H (ref 0–20)
FOLATE SERPL-MCNC: 5 NG/ML
RHEUMATOID FACT SER NEPH-ACNC: <10 IU/ML (ref 0–15)
TSH SERPL-ACNC: 1.64 MIU/L (ref 0.44–3.98)
VIT B12 SERPL-MCNC: 242 PG/ML (ref 211–911)

## 2024-07-03 NOTE — TELEPHONE ENCOUNTER
----- Message from Lida RENDON DO sent at 7/3/2024  8:48 AM EDT -----  Pts folate and b12 are low normal.  Recommend taking Folic acid 1 mg/day as well as Vitamin B12 1000 mcg/day-can get these OTC  I'm still waiting on one other lab and he needs to get his PVR test done   Muscle enzymes and inflammatory markers were negative thus far

## 2024-07-05 LAB — ANA SER QL HEP2 SUBST: NEGATIVE

## 2024-07-15 RX ORDER — ONDANSETRON HYDROCHLORIDE 2 MG/ML
4 INJECTION, SOLUTION INTRAVENOUS ONCE AS NEEDED
Status: CANCELLED | OUTPATIENT
Start: 2024-07-15

## 2024-07-18 ENCOUNTER — HOSPITAL ENCOUNTER (OUTPATIENT)
Dept: GASTROENTEROLOGY | Facility: HOSPITAL | Age: 64
Discharge: HOME | End: 2024-07-18
Payer: COMMERCIAL

## 2024-07-18 ENCOUNTER — ANESTHESIA (OUTPATIENT)
Dept: GASTROENTEROLOGY | Facility: HOSPITAL | Age: 64
End: 2024-07-18
Payer: COMMERCIAL

## 2024-07-18 ENCOUNTER — ANESTHESIA EVENT (OUTPATIENT)
Dept: GASTROENTEROLOGY | Facility: HOSPITAL | Age: 64
End: 2024-07-18
Payer: COMMERCIAL

## 2024-07-18 VITALS
WEIGHT: 197.31 LBS | OXYGEN SATURATION: 97 % | HEART RATE: 57 BPM | HEIGHT: 74 IN | RESPIRATION RATE: 18 BRPM | SYSTOLIC BLOOD PRESSURE: 150 MMHG | BODY MASS INDEX: 25.32 KG/M2 | TEMPERATURE: 97.9 F | DIASTOLIC BLOOD PRESSURE: 78 MMHG

## 2024-07-18 DIAGNOSIS — Z12.12 SCREENING FOR COLORECTAL CANCER: ICD-10-CM

## 2024-07-18 DIAGNOSIS — Z12.11 SCREENING FOR COLORECTAL CANCER: ICD-10-CM

## 2024-07-18 PROCEDURE — 7100000010 HC PHASE TWO TIME - EACH INCREMENTAL 1 MINUTE

## 2024-07-18 PROCEDURE — 3700000001 HC GENERAL ANESTHESIA TIME - INITIAL BASE CHARGE

## 2024-07-18 PROCEDURE — 2500000004 HC RX 250 GENERAL PHARMACY W/ HCPCS (ALT 636 FOR OP/ED): Performed by: NURSE ANESTHETIST, CERTIFIED REGISTERED

## 2024-07-18 PROCEDURE — A45378 PR COLONOSCOPY,DIAGNOSTIC: Performed by: NURSE ANESTHETIST, CERTIFIED REGISTERED

## 2024-07-18 PROCEDURE — A45378 PR COLONOSCOPY,DIAGNOSTIC: Performed by: ANESTHESIOLOGY

## 2024-07-18 PROCEDURE — 7100000009 HC PHASE TWO TIME - INITIAL BASE CHARGE

## 2024-07-18 PROCEDURE — 2500000005 HC RX 250 GENERAL PHARMACY W/O HCPCS: Performed by: NURSE ANESTHETIST, CERTIFIED REGISTERED

## 2024-07-18 PROCEDURE — 2500000004 HC RX 250 GENERAL PHARMACY W/ HCPCS (ALT 636 FOR OP/ED): Performed by: STUDENT IN AN ORGANIZED HEALTH CARE EDUCATION/TRAINING PROGRAM

## 2024-07-18 PROCEDURE — 45385 COLONOSCOPY W/LESION REMOVAL: CPT | Performed by: STUDENT IN AN ORGANIZED HEALTH CARE EDUCATION/TRAINING PROGRAM

## 2024-07-18 PROCEDURE — 3700000002 HC GENERAL ANESTHESIA TIME - EACH INCREMENTAL 1 MINUTE

## 2024-07-18 RX ORDER — PROPOFOL 10 MG/ML
INJECTION, EMULSION INTRAVENOUS CONTINUOUS PRN
Status: DISCONTINUED | OUTPATIENT
Start: 2024-07-18 | End: 2024-07-18

## 2024-07-18 RX ORDER — LIDOCAINE HCL/PF 100 MG/5ML
SYRINGE (ML) INTRAVENOUS AS NEEDED
Status: DISCONTINUED | OUTPATIENT
Start: 2024-07-18 | End: 2024-07-18

## 2024-07-18 RX ORDER — SODIUM CHLORIDE, SODIUM LACTATE, POTASSIUM CHLORIDE, CALCIUM CHLORIDE 600; 310; 30; 20 MG/100ML; MG/100ML; MG/100ML; MG/100ML
20 INJECTION, SOLUTION INTRAVENOUS CONTINUOUS
Status: DISCONTINUED | OUTPATIENT
Start: 2024-07-18 | End: 2024-07-19 | Stop reason: HOSPADM

## 2024-07-18 SDOH — HEALTH STABILITY: MENTAL HEALTH: CURRENT SMOKER: 1

## 2024-07-18 ASSESSMENT — PAIN SCALES - GENERAL
PAINLEVEL_OUTOF10: 0 - NO PAIN
PAINLEVEL_OUTOF10: 0 - NO PAIN
PAIN_LEVEL: 0
PAINLEVEL_OUTOF10: 0 - NO PAIN
PAINLEVEL_OUTOF10: 0 - NO PAIN

## 2024-07-18 ASSESSMENT — PAIN - FUNCTIONAL ASSESSMENT
PAIN_FUNCTIONAL_ASSESSMENT: 0-10

## 2024-07-18 NOTE — ANESTHESIA PREPROCEDURE EVALUATION
Patient: Devyn Pedraza    Procedure Information       Date/Time: 07/18/24 1230    Scheduled providers: Kana Wild MD    Procedure: COLONOSCOPY    Location: [facility]            Relevant Problems   Cardiac   (+) Coronary artery disease   (+) Hyperlipidemia   (+) Presence of stent in coronary artery       Clinical information reviewed:    Allergies  Meds               NPO Detail:  NPO/Void Status  Date of Last Liquid: 07/17/24  Time of Last Liquid: 2359  Date of Last Solid: 07/16/24  Time of Last Solid: 1900         Physical Exam    Airway  Mallampati: I  TM distance: >3 FB  Neck ROM: full     Cardiovascular - normal exam     Dental - normal exam     Pulmonary - normal exam     Abdominal - normal exam             Anesthesia Plan    History of general anesthesia?: yes  History of complications of general anesthesia?: no    ASA 2     MAC     The patient is a current smoker.  Patient was previously instructed to abstain from smoking on day of procedure.  Patient smoked on day of procedure.    intravenous induction   Anesthetic plan and risks discussed with patient.  Use of blood products discussed with patient who consented to blood products.    Plan discussed with CRNA.

## 2024-07-18 NOTE — ANESTHESIA POSTPROCEDURE EVALUATION
Patient: Devyn Pedraza    Procedure Summary       Date: 07/18/24 Room / Location: [facility]    Anesthesia Start: 1247 Anesthesia Stop:     Procedure: COLONOSCOPY Diagnosis: Screening for colorectal cancer    Scheduled Providers: Kana Wild MD Responsible Provider: Devyn Healy MD    Anesthesia Type: MAC ASA Status: 2            Anesthesia Type: MAC    Vitals Value Taken Time   /67 07/18/24 1408   Temp 36 07/18/24 1408   Pulse 73 07/18/24 1408   Resp 18 07/18/24 1408   SpO2 96 07/18/24 1408       Anesthesia Post Evaluation    Patient location during evaluation: PACU  Patient participation: complete - patient participated  Level of consciousness: awake and alert  Pain score: 0  Pain management: adequate  Airway patency: patent  Cardiovascular status: acceptable and stable  Respiratory status: acceptable, nonlabored ventilation, spontaneous ventilation and room air  Hydration status: acceptable  Postoperative Nausea and Vomiting: none        There were no known notable events for this encounter.

## 2024-07-18 NOTE — DISCHARGE INSTRUCTIONS

## 2024-07-18 NOTE — POST-PROCEDURE NOTE
Dr. Wild at bedside post procedure to speak with pt and wife. Discharge paperwork reviewed and provided in folder to take home.

## 2024-07-29 LAB
LABORATORY COMMENT REPORT: NORMAL
PATH REPORT.FINAL DX SPEC: NORMAL
PATH REPORT.GROSS SPEC: NORMAL
PATH REPORT.TOTAL CANCER: NORMAL

## 2024-08-09 DIAGNOSIS — R80.9 PROTEINURIA, UNSPECIFIED TYPE: ICD-10-CM

## 2024-08-09 RX ORDER — LISINOPRIL 10 MG/1
10 TABLET ORAL DAILY
Qty: 90 TABLET | Refills: 1 | Status: SHIPPED | OUTPATIENT
Start: 2024-08-09

## 2024-10-08 ENCOUNTER — APPOINTMENT (OUTPATIENT)
Dept: PRIMARY CARE | Facility: CLINIC | Age: 64
End: 2024-10-08
Payer: COMMERCIAL

## 2024-10-08 ENCOUNTER — LAB (OUTPATIENT)
Dept: LAB | Facility: LAB | Age: 64
End: 2024-10-08
Payer: COMMERCIAL

## 2024-10-08 VITALS
WEIGHT: 201.5 LBS | OXYGEN SATURATION: 96 % | TEMPERATURE: 98.5 F | RESPIRATION RATE: 16 BRPM | SYSTOLIC BLOOD PRESSURE: 136 MMHG | HEIGHT: 73 IN | HEART RATE: 71 BPM | BODY MASS INDEX: 26.71 KG/M2 | DIASTOLIC BLOOD PRESSURE: 86 MMHG

## 2024-10-08 DIAGNOSIS — I25.10 CORONARY ARTERY DISEASE INVOLVING NATIVE CORONARY ARTERY OF NATIVE HEART WITHOUT ANGINA PECTORIS: ICD-10-CM

## 2024-10-08 DIAGNOSIS — R79.89 LOW VITAMIN B12 LEVEL: ICD-10-CM

## 2024-10-08 DIAGNOSIS — Z12.5 SCREENING FOR PROSTATE CANCER: ICD-10-CM

## 2024-10-08 DIAGNOSIS — Z13.29 SCREENING FOR THYROID DISORDER: ICD-10-CM

## 2024-10-08 DIAGNOSIS — Z00.00 ROUTINE GENERAL MEDICAL EXAMINATION AT A HEALTH CARE FACILITY: ICD-10-CM

## 2024-10-08 DIAGNOSIS — E66.3 OVERWEIGHT WITH BODY MASS INDEX (BMI) OF 26 TO 26.9 IN ADULT: ICD-10-CM

## 2024-10-08 DIAGNOSIS — E53.8 LOW FOLATE: ICD-10-CM

## 2024-10-08 DIAGNOSIS — E78.2 MIXED HYPERLIPIDEMIA: ICD-10-CM

## 2024-10-08 DIAGNOSIS — R80.9 PROTEINURIA, UNSPECIFIED TYPE: ICD-10-CM

## 2024-10-08 DIAGNOSIS — R73.03 PREDIABETES: ICD-10-CM

## 2024-10-08 DIAGNOSIS — Z72.0 TOBACCO ABUSE: ICD-10-CM

## 2024-10-08 DIAGNOSIS — Z00.00 ROUTINE GENERAL MEDICAL EXAMINATION AT A HEALTH CARE FACILITY: Primary | ICD-10-CM

## 2024-10-08 DIAGNOSIS — I10 PRIMARY HYPERTENSION: ICD-10-CM

## 2024-10-08 DIAGNOSIS — F10.10 ALCOHOL ABUSE: ICD-10-CM

## 2024-10-08 PROBLEM — E55.9 VITAMIN D INSUFFICIENCY: Status: RESOLVED | Noted: 2023-09-12 | Resolved: 2024-10-08

## 2024-10-08 LAB
ALBUMIN SERPL BCP-MCNC: 4.5 G/DL (ref 3.4–5)
ALP SERPL-CCNC: 71 U/L (ref 33–136)
ALT SERPL W P-5'-P-CCNC: 23 U/L (ref 10–52)
ANION GAP SERPL CALC-SCNC: 10 MMOL/L (ref 10–20)
AST SERPL W P-5'-P-CCNC: 17 U/L (ref 9–39)
BILIRUB SERPL-MCNC: 0.4 MG/DL (ref 0–1.2)
BUN SERPL-MCNC: 11 MG/DL (ref 6–23)
CALCIUM SERPL-MCNC: 9.9 MG/DL (ref 8.6–10.6)
CHLORIDE SERPL-SCNC: 104 MMOL/L (ref 98–107)
CHOLEST SERPL-MCNC: 161 MG/DL (ref 0–199)
CHOLESTEROL/HDL RATIO: 3.9
CO2 SERPL-SCNC: 29 MMOL/L (ref 21–32)
CREAT SERPL-MCNC: 0.81 MG/DL (ref 0.5–1.3)
EGFRCR SERPLBLD CKD-EPI 2021: >90 ML/MIN/1.73M*2
ERYTHROCYTE [DISTWIDTH] IN BLOOD BY AUTOMATED COUNT: 13.5 % (ref 11.5–14.5)
EST. AVERAGE GLUCOSE BLD GHB EST-MCNC: 117 MG/DL
FOLATE SERPL-MCNC: >24 NG/ML
GLUCOSE SERPL-MCNC: 105 MG/DL (ref 74–99)
HBA1C MFR BLD: 5.7 %
HCT VFR BLD AUTO: 47.4 % (ref 41–52)
HDLC SERPL-MCNC: 41.6 MG/DL
HGB BLD-MCNC: 16 G/DL (ref 13.5–17.5)
LDLC SERPL CALC-MCNC: 88 MG/DL
MCH RBC QN AUTO: 31.5 PG (ref 26–34)
MCHC RBC AUTO-ENTMCNC: 33.8 G/DL (ref 32–36)
MCV RBC AUTO: 93 FL (ref 80–100)
NON HDL CHOLESTEROL: 119 MG/DL (ref 0–149)
NRBC BLD-RTO: 0 /100 WBCS (ref 0–0)
PLATELET # BLD AUTO: 129 X10*3/UL (ref 150–450)
POTASSIUM SERPL-SCNC: 4.3 MMOL/L (ref 3.5–5.3)
PROT SERPL-MCNC: 7.3 G/DL (ref 6.4–8.2)
PSA SERPL-MCNC: 1.87 NG/ML
RBC # BLD AUTO: 5.08 X10*6/UL (ref 4.5–5.9)
SODIUM SERPL-SCNC: 139 MMOL/L (ref 136–145)
TRIGL SERPL-MCNC: 158 MG/DL (ref 0–149)
TSH SERPL-ACNC: 2.37 MIU/L (ref 0.44–3.98)
VIT B12 SERPL-MCNC: 668 PG/ML (ref 211–911)
VLDL: 32 MG/DL (ref 0–40)
WBC # BLD AUTO: 5.5 X10*3/UL (ref 4.4–11.3)

## 2024-10-08 PROCEDURE — 36415 COLL VENOUS BLD VENIPUNCTURE: CPT

## 2024-10-08 PROCEDURE — 3079F DIAST BP 80-89 MM HG: CPT | Performed by: INTERNAL MEDICINE

## 2024-10-08 PROCEDURE — 82607 VITAMIN B-12: CPT

## 2024-10-08 PROCEDURE — G0103 PSA SCREENING: HCPCS

## 2024-10-08 PROCEDURE — 83036 HEMOGLOBIN GLYCOSYLATED A1C: CPT

## 2024-10-08 PROCEDURE — 85027 COMPLETE CBC AUTOMATED: CPT

## 2024-10-08 PROCEDURE — 80061 LIPID PANEL: CPT

## 2024-10-08 PROCEDURE — 99396 PREV VISIT EST AGE 40-64: CPT | Performed by: INTERNAL MEDICINE

## 2024-10-08 PROCEDURE — 80053 COMPREHEN METABOLIC PANEL: CPT

## 2024-10-08 PROCEDURE — 3008F BODY MASS INDEX DOCD: CPT | Performed by: INTERNAL MEDICINE

## 2024-10-08 PROCEDURE — 3075F SYST BP GE 130 - 139MM HG: CPT | Performed by: INTERNAL MEDICINE

## 2024-10-08 PROCEDURE — 82746 ASSAY OF FOLIC ACID SERUM: CPT

## 2024-10-08 PROCEDURE — 84443 ASSAY THYROID STIM HORMONE: CPT

## 2024-10-08 RX ORDER — FOLIC ACID 1 MG/1
TABLET ORAL DAILY
COMMUNITY

## 2024-10-08 RX ORDER — LISINOPRIL 10 MG/1
15 TABLET ORAL DAILY
Qty: 135 TABLET | Refills: 1 | Status: SHIPPED | OUTPATIENT
Start: 2024-10-08

## 2024-10-08 ASSESSMENT — ENCOUNTER SYMPTOMS
RHINORRHEA: 0
FATIGUE: 0
CHILLS: 0
WOUND: 0
ADENOPATHY: 0
EYE PAIN: 0
EYE ITCHING: 0
CHEST TIGHTNESS: 0
NAUSEA: 0
ANAL BLEEDING: 0
TREMORS: 0
JOINT SWELLING: 0
CONFUSION: 0
VOMITING: 0
POLYPHAGIA: 0
UNEXPECTED WEIGHT CHANGE: 0
EYE REDNESS: 0
RECTAL PAIN: 0
NECK PAIN: 0
FREQUENCY: 0
DECREASED CONCENTRATION: 0
FEVER: 0
POLYDIPSIA: 0
WHEEZING: 0
ABDOMINAL PAIN: 0
BRUISES/BLEEDS EASILY: 0
DYSPHORIC MOOD: 0
SINUS PRESSURE: 0
SEIZURES: 0
LIGHT-HEADEDNESS: 0
DIFFICULTY URINATING: 0
COUGH: 0
VOICE CHANGE: 0
SORE THROAT: 0
DYSURIA: 0
STRIDOR: 0
ARTHRALGIAS: 0
SHORTNESS OF BREATH: 0
SINUS PAIN: 0
TROUBLE SWALLOWING: 0
APNEA: 0
ABDOMINAL DISTENTION: 0
BACK PAIN: 0
DIAPHORESIS: 0
EYE DISCHARGE: 0
NECK STIFFNESS: 0
DIZZINESS: 0
SLEEP DISTURBANCE: 0
MYALGIAS: 0
COLOR CHANGE: 0
WEAKNESS: 0
AGITATION: 0
NUMBNESS: 0
SPEECH DIFFICULTY: 0
ACTIVITY CHANGE: 0
PALPITATIONS: 0
HEADACHES: 0
DIARRHEA: 0
FLANK PAIN: 0
HEMATURIA: 0
CHOKING: 0
CONSTIPATION: 0
APPETITE CHANGE: 0
FACIAL SWELLING: 0
PHOTOPHOBIA: 0
FACIAL ASYMMETRY: 0
HALLUCINATIONS: 0
NERVOUS/ANXIOUS: 0
BLOOD IN STOOL: 0
HYPERACTIVE: 0

## 2024-10-08 ASSESSMENT — PATIENT HEALTH QUESTIONNAIRE - PHQ9
1. LITTLE INTEREST OR PLEASURE IN DOING THINGS: NOT AT ALL
2. FEELING DOWN, DEPRESSED OR HOPELESS: NOT AT ALL
SUM OF ALL RESPONSES TO PHQ9 QUESTIONS 1 AND 2: 0

## 2024-10-08 NOTE — PROGRESS NOTES
Subjective   Patient ID: Devyn Pedraza is a 64 y.o. male who presents for Annual Exam.    HPI  Pt here for physical.    He had colonoscopy in July and did have multiple polyps noted.  Repeat noted for 5 years.  7 polyps were hyperplastic and one was tubular adenoma/other sessile serrated polyp.  He uses metamucil to help regular bowels.  He has diverticulitis at times.      He saw me back in July with some claudication symptoms.  PVR was ordered but not completed.  His blood work at that time showed low folate levels, slightly elevated CRP but negative CK/CHRISSY/RF.  He was noted to have low normal B12.  He is taking vitamin B12 and Folic acid and this did help lessen his leg pain/twitching.      He continues to smoke 1 ppd for the last 40+ years.  He still uses marijuana at times.  He does drink heavily 2 times a week.      He saw eye doc in June and was noted to have htn eye retinopathy.          Review of Systems   Constitutional:  Negative for activity change, appetite change, chills, diaphoresis, fatigue, fever and unexpected weight change.   HENT:  Negative for congestion, dental problem, drooling, ear discharge, ear pain, facial swelling, hearing loss, mouth sores, nosebleeds, postnasal drip, rhinorrhea, sinus pressure, sinus pain, sneezing, sore throat, tinnitus, trouble swallowing and voice change.    Eyes:  Positive for visual disturbance. Negative for photophobia, pain, discharge, redness and itching.   Respiratory:  Negative for apnea, cough, choking, chest tightness, shortness of breath, wheezing and stridor.    Cardiovascular:  Negative for chest pain, palpitations and leg swelling.   Gastrointestinal:  Negative for abdominal distention, abdominal pain, anal bleeding, blood in stool, constipation, diarrhea, nausea, rectal pain and vomiting.   Endocrine: Negative for cold intolerance, heat intolerance, polydipsia, polyphagia and polyuria.   Genitourinary:  Negative for decreased urine volume, difficulty  "urinating, dysuria, enuresis, flank pain, frequency, genital sores, hematuria, penile discharge, penile pain, penile swelling, scrotal swelling, testicular pain and urgency.        Occasional nocturia    Musculoskeletal:  Negative for arthralgias, back pain, gait problem, joint swelling, myalgias, neck pain and neck stiffness.   Skin:  Negative for color change, pallor, rash and wound.   Allergic/Immunologic: Negative for environmental allergies, food allergies and immunocompromised state.   Neurological:  Negative for dizziness, tremors, seizures, syncope, facial asymmetry, speech difficulty, weakness, light-headedness, numbness and headaches.   Hematological:  Negative for adenopathy. Does not bruise/bleed easily.   Psychiatric/Behavioral:  Negative for agitation, behavioral problems, confusion, decreased concentration, dysphoric mood, hallucinations, self-injury, sleep disturbance and suicidal ideas. The patient is not nervous/anxious and is not hyperactive.        Objective   /86   Pulse 71   Temp 36.9 °C (98.5 °F)   Resp 16   Ht 1.842 m (6' 0.5\")   Wt 91.4 kg (201 lb 8 oz)   SpO2 96%   BMI 26.95 kg/m²      Physical Exam  Constitutional:       Appearance: Normal appearance.   HENT:      Head: Normocephalic and atraumatic.      Right Ear: Tympanic membrane, ear canal and external ear normal. There is no impacted cerumen.      Left Ear: Tympanic membrane, ear canal and external ear normal. There is no impacted cerumen.      Nose: Nose normal. No congestion or rhinorrhea.      Mouth/Throat:      Mouth: Mucous membranes are moist.      Pharynx: Oropharynx is clear. No oropharyngeal exudate or posterior oropharyngeal erythema.   Eyes:      Extraocular Movements: Extraocular movements intact.      Conjunctiva/sclera: Conjunctivae normal.      Pupils: Pupils are equal, round, and reactive to light.   Neck:      Vascular: No carotid bruit.   Cardiovascular:      Rate and Rhythm: Normal rate and regular " rhythm.      Pulses: Normal pulses.      Heart sounds: Normal heart sounds. No murmur heard.  Pulmonary:      Effort: Pulmonary effort is normal. No respiratory distress.      Breath sounds: Normal breath sounds. No wheezing, rhonchi or rales.   Abdominal:      General: Abdomen is flat. Bowel sounds are normal. There is no distension.      Palpations: Abdomen is soft.      Tenderness: There is no abdominal tenderness.      Hernia: No hernia is present.   Musculoskeletal:         General: No swelling or tenderness. Normal range of motion.      Cervical back: Normal range of motion and neck supple.      Right lower leg: No edema.      Left lower leg: No edema.   Lymphadenopathy:      Cervical: No cervical adenopathy.   Skin:     General: Skin is warm and dry.      Findings: No lesion or rash.   Neurological:      General: No focal deficit present.      Mental Status: He is alert and oriented to person, place, and time.      Cranial Nerves: No cranial nerve deficit.      Sensory: No sensory deficit.      Motor: No weakness.   Psychiatric:         Mood and Affect: Mood normal.         Behavior: Behavior normal.         Thought Content: Thought content normal.         Judgment: Judgment normal.         Assessment/Plan   Problem List Items Addressed This Visit       Coronary artery disease     Restarted statin therapy  Continues to smoke however          Relevant Orders    Lipid Panel    Hyperlipidemia    Relevant Orders    Lipid Panel    Proteinuria     On ACEI  Repeat urinalysis          Relevant Medications    lisinopril 10 mg tablet    Other Relevant Orders    Urinalysis with Reflex Microscopic    Tobacco abuse     Pt continues to smoke 1 ppd for over 40 years  Declines CT low dose lung screening at this time  Declines flu shot  Encouraged cessation          Primary hypertension     Still a bit high  He is very reluctant to increase lisinopril dose too much  Is ok with going to 15 mg/day-will take 1.5 tablets of his  10 mg dose for now  Noted to have signs of HTN in his recent eye exam          Relevant Orders    Follow Up In Primary Care - Established    Overweight with body mass index (BMI) of 26 to 26.9 in adult     Encouraged pt to work on healthy diet and exercise regularly          Low folate     On folic acid           Relevant Orders    Folate    Low vitamin B12 level     On supplementation now  Repeat levels  Secondary to heavy alcohol use-discussed need to lessen amount          Relevant Orders    Vitamin B12    Alcohol abuse     Other Visit Diagnoses       Routine general medical examination at a health care facility    -  Primary    Relevant Orders    Comprehensive Metabolic Panel    CBC    Prediabetes        Relevant Orders    Hemoglobin A1C    Urinalysis with Reflex Microscopic    Screening for thyroid disorder        Relevant Orders    TSH with reflex to Free T4 if abnormal    Screening for prostate cancer        Relevant Orders    Prostate Specific Antigen

## 2024-10-08 NOTE — ASSESSMENT & PLAN NOTE
Still a bit high  He is very reluctant to increase lisinopril dose too much  Is ok with going to 15 mg/day-will take 1.5 tablets of his 10 mg dose for now  Noted to have signs of HTN in his recent eye exam

## 2024-10-08 NOTE — ASSESSMENT & PLAN NOTE
On supplementation now  Repeat levels  Secondary to heavy alcohol use-discussed need to lessen amount

## 2024-10-08 NOTE — PATIENT INSTRUCTIONS
Continue folic acid and vitamin B12 as directed  Increase Lisinopril to 15 mg a day by taking 1.5 tablets daily to help improve blood pressure and impact on your eyes  Lessen alcohol consumption as this is cause for low B12/folic acid levels and why your muscles were twitching-long term alcohol use is known to cause neurological impairments   Lessen and work to stop smoking; consider CT low dose lung screening to assess for any lung nodules  Improve diet and exercise regularly for general health and due to your history of heart disease  Get blood work and urine today and we will call with results  Follow up here in 6 months for blood pressure check

## 2024-10-08 NOTE — ASSESSMENT & PLAN NOTE
Pt continues to smoke 1 ppd for over 40 years  Declines CT low dose lung screening at this time  Declines flu shot  Encouraged cessation

## 2024-10-09 ENCOUNTER — TELEPHONE (OUTPATIENT)
Dept: PRIMARY CARE | Facility: CLINIC | Age: 64
End: 2024-10-09
Payer: COMMERCIAL

## 2024-10-09 NOTE — TELEPHONE ENCOUNTER
----- Message from Lida Sellers sent at 10/9/2024  6:56 AM EDT -----  Labs have improved in terms of cholesterol, b12 and folic acid-all good things, sugars are still borderline-prediabetic so just continue to work on healthy diet-lean protein/fish/more plant based, exercise regularly and work to lessen smoking/alcohol consumption

## 2024-10-10 ENCOUNTER — TELEPHONE (OUTPATIENT)
Dept: PRIMARY CARE | Facility: CLINIC | Age: 64
End: 2024-10-10
Payer: COMMERCIAL

## 2024-10-28 DIAGNOSIS — E78.2 MIXED HYPERLIPIDEMIA: ICD-10-CM

## 2024-10-28 RX ORDER — ATORVASTATIN CALCIUM 20 MG/1
20 TABLET, FILM COATED ORAL NIGHTLY
Qty: 90 TABLET | Refills: 3 | Status: SHIPPED | OUTPATIENT
Start: 2024-10-28

## 2024-11-12 ENCOUNTER — TELEPHONE (OUTPATIENT)
Dept: PRIMARY CARE | Facility: CLINIC | Age: 64
End: 2024-11-12
Payer: COMMERCIAL

## 2024-11-12 DIAGNOSIS — K57.92 DIVERTICULITIS: Primary | ICD-10-CM

## 2024-11-12 NOTE — TELEPHONE ENCOUNTER
Informed patient.  He has had the abdominal pain for a couple of days now and has had this problem for the last 20 years and knows when this flairs up.  He discussed with you at last visit that it typically occurs once or twice a year.

## 2024-11-12 NOTE — TELEPHONE ENCOUNTER
He needs to be seen or need to know what symptoms is he having/how long has he had, etc -I don't just send in meds for diverticuli flares-

## 2024-11-12 NOTE — TELEPHONE ENCOUNTER
Patient called and is feeling a diverticulitis flare-up coming on and wanted to know if you can call in Amoxicillin for him to [facility].

## 2024-11-13 RX ORDER — AMOXICILLIN AND CLAVULANATE POTASSIUM 875; 125 MG/1; MG/1
875 TABLET, FILM COATED ORAL 2 TIMES DAILY
Qty: 20 TABLET | Refills: 0 | Status: SHIPPED | OUTPATIENT
Start: 2024-11-13 | End: 2024-11-23

## 2025-04-08 ENCOUNTER — APPOINTMENT (OUTPATIENT)
Dept: PRIMARY CARE | Facility: CLINIC | Age: 65
End: 2025-04-08
Payer: COMMERCIAL

## 2025-04-08 VITALS
BODY MASS INDEX: 25.99 KG/M2 | SYSTOLIC BLOOD PRESSURE: 126 MMHG | RESPIRATION RATE: 16 BRPM | HEART RATE: 74 BPM | OXYGEN SATURATION: 97 % | WEIGHT: 194.3 LBS | DIASTOLIC BLOOD PRESSURE: 66 MMHG

## 2025-04-08 DIAGNOSIS — R80.9 PROTEINURIA, UNSPECIFIED TYPE: ICD-10-CM

## 2025-04-08 DIAGNOSIS — R73.03 PREDIABETES: ICD-10-CM

## 2025-04-08 DIAGNOSIS — Z72.0 TOBACCO ABUSE: ICD-10-CM

## 2025-04-08 DIAGNOSIS — I10 PRIMARY HYPERTENSION: Primary | ICD-10-CM

## 2025-04-08 PROCEDURE — 3078F DIAST BP <80 MM HG: CPT | Performed by: INTERNAL MEDICINE

## 2025-04-08 PROCEDURE — 99214 OFFICE O/P EST MOD 30 MIN: CPT | Performed by: INTERNAL MEDICINE

## 2025-04-08 PROCEDURE — 3074F SYST BP LT 130 MM HG: CPT | Performed by: INTERNAL MEDICINE

## 2025-04-08 RX ORDER — LISINOPRIL 10 MG/1
10 TABLET ORAL DAILY
Status: SHIPPED
Start: 2025-04-08

## 2025-04-08 ASSESSMENT — ENCOUNTER SYMPTOMS
WHEEZING: 0
ABDOMINAL PAIN: 0
DIZZINESS: 0
SLEEP DISTURBANCE: 1
APPETITE CHANGE: 0
UNEXPECTED WEIGHT CHANGE: 0
CHEST TIGHTNESS: 0
FEVER: 0
CHILLS: 0
NERVOUS/ANXIOUS: 0
SHORTNESS OF BREATH: 0
PALPITATIONS: 0
VOMITING: 0
ACTIVITY CHANGE: 0
FATIGUE: 0
DYSPHORIC MOOD: 0
COUGH: 0
CONSTIPATION: 0
HEADACHES: 0
NAUSEA: 0
DECREASED CONCENTRATION: 0
LIGHT-HEADEDNESS: 0
DIFFICULTY URINATING: 0
DIARRHEA: 0

## 2025-04-08 NOTE — PATIENT INSTRUCTIONS
Consider the 2 part shingles-you get the series then done for life  Consider the 1 time dose of pneumonia shot (prevnar 20)  Continue to work to quit smoking   Continue meds as directed  Lessen sugar/carbs in diet due to prediabetes noted on last blood work  Continue to exercise regularly-goal of 150 minutes/week  Follow up here in 6 months for full physical (come fasting for blood work and urine evaluation-drink water/black coffee)

## 2025-04-08 NOTE — PROGRESS NOTES
Subjective   Patient ID: Devyn Pedraza is a 64 y.o. male who presents for Follow-up (6 month ).    HPI  Pt here in 6 month follow up.  He is exercising more and trying to eat better.      He is not taking his BP at home.  He is only taking 10 mg of Lisinopril currently.  His BP is good today.  He never increased to 15 mg/day.      He is on fish oil and atorvastatin.      He has some blurry vision first thing in AM when getting up.      He had labs after last visit and did have prediabetes 5.7%.  he stopped some of his juices and instead is going to more no sugar beverages.      He is working to cut back on smoking-doing 1/2 cigarette an hour instead-down to about a 1/2 ppd.      Review of Systems   Constitutional:  Negative for activity change, appetite change, chills, fatigue, fever and unexpected weight change.   Eyes:  Positive for visual disturbance.   Respiratory:  Negative for cough, chest tightness, shortness of breath and wheezing.    Cardiovascular:  Negative for chest pain, palpitations and leg swelling.   Gastrointestinal:  Negative for abdominal pain, constipation, diarrhea, nausea and vomiting.   Genitourinary:  Negative for difficulty urinating.   Neurological:  Negative for dizziness, light-headedness and headaches.   Psychiatric/Behavioral:  Positive for sleep disturbance. Negative for decreased concentration and dysphoric mood. The patient is not nervous/anxious.        Objective   /66 (BP Location: Left arm, Patient Position: Sitting)   Pulse 74   Resp 16   Wt 88.1 kg (194 lb 4.8 oz)   SpO2 97%   BMI 25.99 kg/m²      Physical Exam  Constitutional:       Appearance: Normal appearance.   Cardiovascular:      Rate and Rhythm: Normal rate and regular rhythm.      Heart sounds: Normal heart sounds.   Pulmonary:      Effort: Pulmonary effort is normal.      Breath sounds: Normal breath sounds.   Abdominal:      General: Bowel sounds are normal.      Palpations: Abdomen is soft.    Musculoskeletal:      Right lower leg: No edema.      Left lower leg: No edema.   Lymphadenopathy:      Cervical: No cervical adenopathy.   Neurological:      Mental Status: He is alert and oriented to person, place, and time.   Psychiatric:         Mood and Affect: Mood normal.         Assessment/Plan   Problem List Items Addressed This Visit       Proteinuria     On ACEI         Relevant Medications    lisinopril 10 mg tablet    Other Relevant Orders    Follow Up In Primary Care - Health Maintenance    Tobacco abuse     Lessening use  Down to 1/2 ppd          Relevant Orders    Follow Up In Primary Care - Health Maintenance    Primary hypertension - Primary     BP is improved today  On Lisinopril 10 mg-never increased to 15 mg which is ok based on today's reading  Doesn't take BP at home-encouraged to consider   Low salt/caffeine diet          Relevant Orders    Follow Up In Primary Care - Health Maintenance    Prediabetes     Improving diet  Lessened juice intake   Will repeat at next visit          Relevant Orders    Follow Up In Primary Care - Health Maintenance

## 2025-04-08 NOTE — ASSESSMENT & PLAN NOTE
BP is improved today  On Lisinopril 10 mg-never increased to 15 mg which is ok based on today's reading  Doesn't take BP at home-encouraged to consider   Low salt/caffeine diet

## 2025-07-28 DIAGNOSIS — R80.9 PROTEINURIA, UNSPECIFIED TYPE: ICD-10-CM

## 2025-07-28 RX ORDER — LISINOPRIL 10 MG/1
15 TABLET ORAL DAILY
Qty: 135 TABLET | Refills: 1 | Status: SHIPPED | OUTPATIENT
Start: 2025-07-28

## 2025-08-14 DIAGNOSIS — E78.2 MIXED HYPERLIPIDEMIA: ICD-10-CM

## 2025-08-14 RX ORDER — ATORVASTATIN CALCIUM 20 MG/1
20 TABLET, FILM COATED ORAL NIGHTLY
Qty: 90 TABLET | Refills: 3 | Status: SHIPPED | OUTPATIENT
Start: 2025-08-14

## 2025-10-10 ENCOUNTER — APPOINTMENT (OUTPATIENT)
Dept: PRIMARY CARE | Facility: CLINIC | Age: 65
End: 2025-10-10
Payer: COMMERCIAL